# Patient Record
Sex: MALE | Race: BLACK OR AFRICAN AMERICAN | NOT HISPANIC OR LATINO | Employment: UNEMPLOYED | URBAN - METROPOLITAN AREA
[De-identification: names, ages, dates, MRNs, and addresses within clinical notes are randomized per-mention and may not be internally consistent; named-entity substitution may affect disease eponyms.]

---

## 2020-03-06 ENCOUNTER — HOSPITAL ENCOUNTER (EMERGENCY)
Facility: HOSPITAL | Age: 30
Discharge: HOME | End: 2020-03-06
Attending: EMERGENCY MEDICINE
Payer: COMMERCIAL

## 2020-03-06 VITALS
SYSTOLIC BLOOD PRESSURE: 131 MMHG | RESPIRATION RATE: 19 BRPM | OXYGEN SATURATION: 100 % | TEMPERATURE: 99 F | DIASTOLIC BLOOD PRESSURE: 95 MMHG | HEIGHT: 67 IN | BODY MASS INDEX: 25.57 KG/M2 | HEART RATE: 85 BPM | WEIGHT: 162.94 LBS

## 2020-03-06 DIAGNOSIS — L23.5 ALLERGIC DERMATITIS DUE TO OTHER CHEMICAL PRODUCT: Primary | ICD-10-CM

## 2020-03-06 PROCEDURE — 25000003 PHARM REV CODE 250: Mod: ER | Performed by: EMERGENCY MEDICINE

## 2020-03-06 PROCEDURE — 96374 THER/PROPH/DIAG INJ IV PUSH: CPT | Mod: ER

## 2020-03-06 PROCEDURE — 99284 EMERGENCY DEPT VISIT MOD MDM: CPT | Mod: 25,ER

## 2020-03-06 PROCEDURE — 63600175 PHARM REV CODE 636 W HCPCS: Mod: ER | Performed by: EMERGENCY MEDICINE

## 2020-03-06 RX ORDER — DIPHENHYDRAMINE HCL 25 MG
25 CAPSULE ORAL
Status: COMPLETED | OUTPATIENT
Start: 2020-03-06 | End: 2020-03-06

## 2020-03-06 RX ORDER — DIPHENHYDRAMINE HCL 25 MG
25 CAPSULE ORAL EVERY 6 HOURS PRN
Qty: 20 CAPSULE | Refills: 0 | Status: SHIPPED | OUTPATIENT
Start: 2020-03-06 | End: 2020-03-13

## 2020-03-06 RX ORDER — DESONIDE 0.5 MG/G
CREAM TOPICAL 2 TIMES DAILY
Qty: 1 TUBE | Refills: 0 | Status: SHIPPED | OUTPATIENT
Start: 2020-03-06 | End: 2020-03-13

## 2020-03-06 RX ORDER — DEXAMETHASONE SODIUM PHOSPHATE 4 MG/ML
8 INJECTION, SOLUTION INTRA-ARTICULAR; INTRALESIONAL; INTRAMUSCULAR; INTRAVENOUS; SOFT TISSUE
Status: COMPLETED | OUTPATIENT
Start: 2020-03-06 | End: 2020-03-06

## 2020-03-06 RX ORDER — FAMOTIDINE 20 MG/1
20 TABLET, FILM COATED ORAL
Status: COMPLETED | OUTPATIENT
Start: 2020-03-06 | End: 2020-03-06

## 2020-03-06 RX ADMIN — DIPHENHYDRAMINE HYDROCHLORIDE 25 MG: 25 CAPSULE ORAL at 09:03

## 2020-03-06 RX ADMIN — DEXAMETHASONE SODIUM PHOSPHATE 8 MG: 4 INJECTION, SOLUTION INTRA-ARTICULAR; INTRALESIONAL; INTRAMUSCULAR; INTRAVENOUS; SOFT TISSUE at 09:03

## 2020-03-06 RX ADMIN — FAMOTIDINE 20 MG: 20 TABLET ORAL at 09:03

## 2020-03-06 NOTE — ED PROVIDER NOTES
Encounter Date: 3/6/2020       History     Chief Complaint   Patient presents with    Allergic Reaction     swelling to face fazal to cream used for rash.      29-year-old male with no significant past medical history presents the emergency department with complaints of a pruritic, red, rash.  The rash is localized to the lower face. Is started last night. It is worsening. Patient thinks it was caused by a cheap lotion that he used to treat some itching he feels was caused by eating strawberries. Patient tried using an anti-histamine cream, but this did not improve his symptoms. Mild associated facial swelling.         Review of patient's allergies indicates:   Allergen Reactions    Strawberries [strawberry] Rash     History reviewed. No pertinent past medical history.  History reviewed. No pertinent surgical history.  History reviewed. No pertinent family history.  Social History     Tobacco Use    Smoking status: Current Every Day Smoker     Packs/day: 0.50    Smokeless tobacco: Never Used   Substance Use Topics    Alcohol use: Not Currently    Drug use: Not on file     Review of Systems   Constitutional: Negative for chills and fever.   HENT: Positive for facial swelling. Negative for congestion, dental problem, sinus pressure, sore throat and trouble swallowing.    Eyes: Negative for pain and visual disturbance.   Respiratory: Negative for cough and shortness of breath.    Cardiovascular: Negative for chest pain and palpitations.   Gastrointestinal: Negative for abdominal pain, diarrhea, nausea and vomiting.   Genitourinary: Negative for dysuria and flank pain.   Musculoskeletal: Negative for back pain and neck pain.   Skin: Positive for rash. Negative for wound.   Neurological: Negative for weakness, numbness and headaches.       Physical Exam     Initial Vitals [03/06/20 0936]   BP Pulse Resp Temp SpO2   (!) 189/87 86 19 -- 99 %      MAP       --         Physical Exam    Nursing note and vitals  reviewed.  Constitutional: He appears well-developed and well-nourished. No distress.   HENT:   Head: Normocephalic and atraumatic.   There is diffuse, raised, weeping, pink rash to the lower face on the bilateral zygomatic regions, and cheeks, and the marysol-oral regions c/w contact dermatitis. Patient without any tongue, soft palate, or other OP swelling.    Eyes: EOM are normal. Pupils are equal, round, and reactive to light.   Neck: Normal range of motion. Neck supple.   Cardiovascular: Normal rate and regular rhythm.   Pulmonary/Chest: Breath sounds normal. No stridor. No respiratory distress. He has no wheezes.   Abdominal: He exhibits no distension. There is no tenderness.   Musculoskeletal: Normal range of motion. He exhibits no tenderness.   Neurological: He is alert and oriented to person, place, and time.   Skin: Skin is warm and dry. Rash noted.   There is lichenification and erythematous rash over the flexure surfaces of the elbows.    Psychiatric: He has a normal mood and affect.         ED Course   Procedures  Labs Reviewed - No data to display       Imaging Results    None                            ED Course as of Mar 06 1024   Fri Mar 06, 2020   1008 10:08 AM Reassessment: I reassessed the pt.  The pt is resting comfortably and is NAD.  Pt states their sx have improved. I discussed test results, shared treatment plan, specific conditions for return, and the need for f/u.I  answered their questions at this time.  Pt understands and agrees to the plan.  The pt has remained hemodynamically stable through ED course and is stable for discharge.       [BA]      ED Course User Index  [BA] Cristobal Harrington MD                Clinical Impression:       ICD-10-CM ICD-9-CM   1. Allergic dermatitis due to other chemical product L23.5 692.4             ED Disposition Condition    Discharge Stable        ED Prescriptions     Medication Sig Dispense Start Date End Date Auth. Provider    desonide (DESOWEN) 0.05 % cream  Apply topically 2 (two) times daily. for 7 days 1 Tube 3/6/2020 3/13/2020 Cristobal Harrington MD    diphenhydrAMINE (BENADRYL) 25 mg capsule Take 1 capsule (25 mg total) by mouth every 6 (six) hours as needed for Itching or Allergies. 20 capsule 3/6/2020 3/13/2020 Cristobal Harrington MD        Follow-up Information     Follow up With Specialties Details Why Contact Info    Your Primary Care Provider  Schedule an appointment as soon as possible for a visit in 1 week For re-evaluation and further treatment     [facility] Go today If symptoms worsen, For re-evaluation and further treatment, As needed [id number]  [place] [address] [phone]                                     Cristobal Harrington MD  03/06/20 8816

## 2020-03-06 NOTE — ED NOTES
Patient AAOx4, moves all extremities, ambulatory without issues. Patient presents to ED today from [facility] in [facility] with guard at bedside. Patient complains of rash that started a week ago. Patient states that he put a cheap lotion on his rash, and then used a benadryl cream afterwards. Patient has lower facial swelling with rash, and rash to bilateral mid arms. Pt in no acute distress, respirations equal and unlabored, chest rise and fall symmetrical, no SOB, no other issues stated at this time.